# Patient Record
Sex: FEMALE | Race: WHITE | NOT HISPANIC OR LATINO | Employment: STUDENT | URBAN - METROPOLITAN AREA
[De-identification: names, ages, dates, MRNs, and addresses within clinical notes are randomized per-mention and may not be internally consistent; named-entity substitution may affect disease eponyms.]

---

## 2017-03-02 ENCOUNTER — GENERIC CONVERSION - ENCOUNTER (OUTPATIENT)
Dept: OTHER | Facility: OTHER | Age: 22
End: 2017-03-02

## 2017-03-31 ENCOUNTER — ALLSCRIPTS OFFICE VISIT (OUTPATIENT)
Dept: OTHER | Facility: OTHER | Age: 22
End: 2017-03-31

## 2017-03-31 LAB — S PYO AG THROAT QL: NEGATIVE

## 2017-04-02 ENCOUNTER — GENERIC CONVERSION - ENCOUNTER (OUTPATIENT)
Dept: OTHER | Facility: OTHER | Age: 22
End: 2017-04-02

## 2017-04-02 LAB
CULTURE RESULT (HISTORICAL): ABNORMAL
MISCELLANEOUS LAB TEST RESULT (HISTORICAL): ABNORMAL

## 2017-08-24 ENCOUNTER — ALLSCRIPTS OFFICE VISIT (OUTPATIENT)
Dept: OTHER | Facility: OTHER | Age: 22
End: 2017-08-24

## 2017-08-24 ENCOUNTER — GENERIC CONVERSION - ENCOUNTER (OUTPATIENT)
Dept: OTHER | Facility: OTHER | Age: 22
End: 2017-08-24

## 2017-09-25 ENCOUNTER — HOSPITAL ENCOUNTER (EMERGENCY)
Facility: HOSPITAL | Age: 22
Discharge: HOME | End: 2017-09-25
Admitting: EMERGENCY MEDICINE
Payer: COMMERCIAL

## 2017-09-25 ENCOUNTER — APPOINTMENT (EMERGENCY)
Dept: RADIOLOGY | Facility: HOSPITAL | Age: 22
End: 2017-09-25
Payer: COMMERCIAL

## 2017-09-25 VITALS
HEART RATE: 71 BPM | HEIGHT: 63 IN | OXYGEN SATURATION: 98 % | BODY MASS INDEX: 28.88 KG/M2 | SYSTOLIC BLOOD PRESSURE: 118 MMHG | RESPIRATION RATE: 16 BRPM | DIASTOLIC BLOOD PRESSURE: 69 MMHG | WEIGHT: 163 LBS | TEMPERATURE: 98.6 F
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
## 2017-09-25 DIAGNOSIS — S16.1XXA CERVICAL STRAIN, ACUTE: ICD-10-CM

## 2017-09-25 DIAGNOSIS — T14.8XXA ABRASION: ICD-10-CM

## 2017-09-25 PROCEDURE — 72040 X-RAY EXAM NECK SPINE 2-3 VW: CPT

## 2017-09-25 PROCEDURE — 99284 EMERGENCY DEPT VISIT MOD MDM: CPT

## 2017-09-25 RX ORDER — IBUPROFEN 600 MG/1
600 TABLET ORAL ONCE
Status: COMPLETED | OUTPATIENT
Start: 2017-09-25 | End: 2017-09-25

## 2017-09-25 RX ORDER — METHOCARBAMOL 500 MG/1
500 TABLET, FILM COATED ORAL ONCE
Status: COMPLETED | OUTPATIENT
Start: 2017-09-25 | End: 2017-09-25

## 2017-09-25 RX ORDER — SUMATRIPTAN 25 MG/1
5 TABLET, FILM COATED ORAL ONCE AS NEEDED
COMMUNITY
End: 2018-10-25 | Stop reason: ALTCHOICE

## 2017-09-25 RX ORDER — METHOCARBAMOL 500 MG/1
500 TABLET, FILM COATED ORAL 3 TIMES DAILY
Qty: 15 TABLET | Refills: 0 | Status: SHIPPED | OUTPATIENT
Start: 2017-09-25 | End: 2018-10-25

## 2017-09-25 RX ADMIN — IBUPROFEN 600 MG: 600 TABLET, FILM COATED ORAL at 15:44

## 2017-09-25 RX ADMIN — METHOCARBAMOL 500 MG: 500 TABLET ORAL at 15:44

## 2017-09-27 ENCOUNTER — ALLSCRIPTS OFFICE VISIT (OUTPATIENT)
Dept: OTHER | Facility: OTHER | Age: 22
End: 2017-09-27

## 2017-12-14 ENCOUNTER — GENERIC CONVERSION - ENCOUNTER (OUTPATIENT)
Dept: FAMILY MEDICINE CLINIC | Facility: HOSPITAL | Age: 22
End: 2017-12-14

## 2018-01-12 VITALS
HEIGHT: 63 IN | SYSTOLIC BLOOD PRESSURE: 120 MMHG | BODY MASS INDEX: 29.95 KG/M2 | WEIGHT: 169 LBS | DIASTOLIC BLOOD PRESSURE: 68 MMHG

## 2018-01-14 VITALS
BODY MASS INDEX: 29.95 KG/M2 | HEIGHT: 63 IN | SYSTOLIC BLOOD PRESSURE: 120 MMHG | WEIGHT: 169 LBS | DIASTOLIC BLOOD PRESSURE: 68 MMHG

## 2018-01-14 VITALS
DIASTOLIC BLOOD PRESSURE: 82 MMHG | BODY MASS INDEX: 29.8 KG/M2 | HEART RATE: 84 BPM | SYSTOLIC BLOOD PRESSURE: 110 MMHG | TEMPERATURE: 98.7 F | WEIGHT: 168.2 LBS | HEIGHT: 63 IN

## 2018-01-14 VITALS
WEIGHT: 167.4 LBS | SYSTOLIC BLOOD PRESSURE: 118 MMHG | DIASTOLIC BLOOD PRESSURE: 60 MMHG | HEIGHT: 63 IN | TEMPERATURE: 99 F | HEART RATE: 88 BPM | BODY MASS INDEX: 29.66 KG/M2

## 2018-02-05 ENCOUNTER — OFFICE VISIT (OUTPATIENT)
Dept: FAMILY MEDICINE CLINIC | Facility: HOSPITAL | Age: 23
End: 2018-02-05
Payer: COMMERCIAL

## 2018-02-05 VITALS
TEMPERATURE: 98.1 F | RESPIRATION RATE: 12 BRPM | BODY MASS INDEX: 30.51 KG/M2 | SYSTOLIC BLOOD PRESSURE: 128 MMHG | HEART RATE: 80 BPM | HEIGHT: 63 IN | WEIGHT: 172.2 LBS | DIASTOLIC BLOOD PRESSURE: 88 MMHG

## 2018-02-05 DIAGNOSIS — J03.00 STREP TONSILLITIS: Primary | ICD-10-CM

## 2018-02-05 LAB — S PYO AG THROAT QL: POSITIVE

## 2018-02-05 PROCEDURE — 87880 STREP A ASSAY W/OPTIC: CPT | Performed by: FAMILY MEDICINE

## 2018-02-05 PROCEDURE — 99213 OFFICE O/P EST LOW 20 MIN: CPT | Performed by: FAMILY MEDICINE

## 2018-02-05 RX ORDER — AZITHROMYCIN 250 MG/1
TABLET, FILM COATED ORAL
Qty: 6 TABLET | Refills: 0 | Status: SHIPPED | OUTPATIENT
Start: 2018-02-05 | End: 2018-02-10

## 2018-02-05 RX ORDER — SUMATRIPTAN 5 MG/1
SPRAY NASAL
COMMUNITY
End: 2019-01-02

## 2018-02-05 RX ORDER — SUMATRIPTAN 100 MG/1
TABLET, FILM COATED ORAL
Refills: 1 | COMMUNITY
Start: 2017-12-14 | End: 2022-02-01

## 2018-02-05 RX ORDER — ONDANSETRON 4 MG/1
TABLET, ORALLY DISINTEGRATING ORAL
Refills: 0 | COMMUNITY
Start: 2017-12-14

## 2018-02-05 NOTE — PROGRESS NOTES
Assessment/Plan:       Diagnoses and all orders for this visit:    Strep tonsillitis  -     POCT rapid strep A    Other orders  -     SUMAtriptan (IMITREX) 5 MG/ACT nasal spray; into each nostril  -     Etonogestrel (NEXPLANON) 68 MG IMPL; Inject under the skin  -     ondansetron (ZOFRAN-ODT) 4 mg disintegrating tablet; DISSOLVE ONE TABLET BY MOUTH EVERY 6 TO 8 HOURS AS NEEDED FOR NAUSEA  -     SUMAtriptan (IMITREX) 100 mg tablet; TAKE ONE TABLET BY MOUTH AT ONSET OF HEADACHE, MAY REPEAT ONCE IN 2 HOURS IF NEEDED  MAX 3 HEADACHES PER WEEK          Subjective:      Patient ID: Atul Alvarenga is a 23 y o  female  Congestion and sore throat worse over past three days  Fevers low grade on and off  Taking Tylenol with benefit  The following portions of the patient's history were reviewed and updated as appropriate: allergies, current medications, past family history, past medical history, past social history, past surgical history and problem list     Review of Systems   Constitutional: Positive for appetite change, fatigue and fever  HENT: Positive for sinus pressure, sore throat and trouble swallowing  Eyes: Negative for pain and redness  Respiratory: Positive for cough  Negative for shortness of breath and wheezing  Gastrointestinal: Negative for abdominal pain  Neurological: Negative for light-headedness and headaches  Objective:     Physical Exam   Constitutional: She appears well-nourished  No distress  HENT:   Head: Atraumatic  Right Ear: External ear normal    Left Ear: External ear normal    Nose: Nose normal    Mouth/Throat: Oropharyngeal exudate present  Eyes: Conjunctivae and EOM are normal    Neck: No thyromegaly present  Cardiovascular: Normal rate, regular rhythm and normal heart sounds  Pulmonary/Chest: She has wheezes  She has no rales  Lymphadenopathy:     She has cervical adenopathy  Skin: No rash noted

## 2018-02-17 ENCOUNTER — OFFICE VISIT (OUTPATIENT)
Dept: FAMILY MEDICINE CLINIC | Facility: HOSPITAL | Age: 23
End: 2018-02-17
Payer: COMMERCIAL

## 2018-02-17 VITALS
TEMPERATURE: 97.9 F | SYSTOLIC BLOOD PRESSURE: 112 MMHG | HEART RATE: 60 BPM | WEIGHT: 176.2 LBS | BODY MASS INDEX: 31.22 KG/M2 | DIASTOLIC BLOOD PRESSURE: 78 MMHG | HEIGHT: 63 IN

## 2018-02-17 DIAGNOSIS — J02.9 PHARYNGITIS, UNSPECIFIED ETIOLOGY: Primary | ICD-10-CM

## 2018-02-17 LAB — S PYO AG THROAT QL: NEGATIVE

## 2018-02-17 PROCEDURE — 99213 OFFICE O/P EST LOW 20 MIN: CPT | Performed by: NURSE PRACTITIONER

## 2018-02-17 PROCEDURE — 87880 STREP A ASSAY W/OPTIC: CPT | Performed by: NURSE PRACTITIONER

## 2018-02-17 PROCEDURE — 3008F BODY MASS INDEX DOCD: CPT | Performed by: NURSE PRACTITIONER

## 2018-02-17 NOTE — PROGRESS NOTES
Assessment/Plan:    Sore throat with negative rapid strep s/p treatment for strep pharyngitis  Will send throat culture to confirm  Possible viral but if continues should consider mono testing  Instructed to call with temp > 102  Diagnoses and all orders for this visit:    Pharyngitis, unspecified etiology  -     POCT rapid strepA  -     Throat culture          Subjective:      Patient ID: Ramon Colunga is a 23 y o  female  Had strep throat about 1 week ago  Completed antibiotic and felt better  Last night started with bad sore throat again  Overnight unable to swallow  No fever,chills  Denies HA , stomach ache or N/V  Denies nasal congestion and runny nose  Has mild cough  Some ear pain  Denies post nasal drip  The following portions of the patient's history were reviewed and updated as appropriate: allergies, current medications, past medical history, past social history and problem list     Review of Systems   Constitutional: Negative for chills, fatigue and fever  HENT: Positive for congestion, ear pain and trouble swallowing  Respiratory: Positive for cough  Negative for shortness of breath and wheezing  Gastrointestinal: Negative for abdominal pain, nausea and vomiting  Neurological: Negative for headaches  Objective:      /78 (Patient Position: Sitting, Cuff Size: Large)   Pulse 60   Temp 97 9 °F (36 6 °C) (Tympanic)   Ht 5' 3" (1 6 m)   Wt 79 9 kg (176 lb 3 2 oz)   BMI 31 21 kg/m²          Physical Exam   Constitutional: She is oriented to person, place, and time  She appears well-developed and well-nourished  HENT:   Right Ear: Tympanic membrane, external ear and ear canal normal    Left Ear: Tympanic membrane, external ear and ear canal normal    Mouth/Throat: Uvula is midline, oropharynx is clear and moist and mucous membranes are normal        Cardiovascular: Normal rate, regular rhythm and normal heart sounds      Pulmonary/Chest: Effort normal and breath sounds normal    Lymphadenopathy:     She has no cervical adenopathy  Neurological: She is alert and oriented to person, place, and time  Skin: Skin is warm and dry  Psychiatric: She has a normal mood and affect

## 2018-10-25 ENCOUNTER — ANNUAL EXAM (OUTPATIENT)
Dept: OBGYN CLINIC | Facility: CLINIC | Age: 23
End: 2018-10-25
Payer: COMMERCIAL

## 2018-10-25 VITALS
HEART RATE: 86 BPM | DIASTOLIC BLOOD PRESSURE: 76 MMHG | HEIGHT: 63 IN | SYSTOLIC BLOOD PRESSURE: 124 MMHG | WEIGHT: 184 LBS | BODY MASS INDEX: 32.6 KG/M2

## 2018-10-25 DIAGNOSIS — Z97.5 NEXPLANON IN PLACE: ICD-10-CM

## 2018-10-25 DIAGNOSIS — Z11.3 SCREENING FOR STD (SEXUALLY TRANSMITTED DISEASE): ICD-10-CM

## 2018-10-25 DIAGNOSIS — Z01.419 WELL FEMALE EXAM WITH ROUTINE GYNECOLOGICAL EXAM: Primary | ICD-10-CM

## 2018-10-25 PROCEDURE — 99395 PREV VISIT EST AGE 18-39: CPT | Performed by: NURSE PRACTITIONER

## 2018-10-25 NOTE — PROGRESS NOTES
Assessment / Plan    1  Well female exam with routine gynecological exam  Normal well woman exam  Patient desires STD screening    - Chlamydia/N  gonorrhoeae RNA, TMA    2  Screening for STD (sexually transmitted disease)  As above  Also interested in LN IUD  - Chlamydia/N  gonorrhoeae RNA, TMA    3  Nexplanon in place  Inserted 10/2015  Due for removal   Patient desires LN-IUD, for which she is an appropriate candidate  Pamphlet for Mirena given  preauth request made-- when complete, RV for IUD insertion and Nexplanon removal       Charles Muro is a 23 y o  female who presents for her annual gynecologic exam     Irregular menstrual bleeding  Has nexplanon which was inserted 10/2015  Interested in [id number] IUD  C/o vaginal dryness/ discomfort with sex past few months  Last pap: n/a  STD screening: desires  Gardasil vaccine: yes    Present sexual partner 5 year relationship  Using condoms  Nexplanon- 10/2015    Periods are irregular, Q 2 weeks  Current contraception: Nexplanon  History of abnormal Pap smear: n/a  Family history of breast,uterine, ovarian or colon cancer: no    Menstrual History:  OB History      Para Term  AB Living    0 0 0 0 0 0    SAB TAB Ectopic Multiple Live Births    0 0 0 0 0        Obstetric Comments    Age at menarche 8  Hx of pregnancy denied  Nexplanon insertion          Menarche age: 8  Patient's last menstrual period was 10/13/2018 (exact date)    Period Cycle (Days): 15  Period Duration (Days): 5 to 7 days   Period Pattern: (!) Irregular  Menstrual Flow: Moderate, Light  Menstrual Control: Maxi pad, Tampon  Dysmenorrhea: (!) Moderate  Dysmenorrhea Symptoms: Cramping, Nausea, Diarrhea, Headache    The following portions of the patient's history were reviewed and updated as appropriate: allergies, current medications, past family history, past medical history, past social history, past surgical history and problem list     Review of Systems Review of Systems   Constitutional: Negative for chills and fever  Gastrointestinal: Negative for abdominal distention, abdominal pain, blood in stool, constipation, diarrhea, nausea and vomiting  Genitourinary: Positive for dyspareunia and menstrual problem  Negative for difficulty urinating, dysuria, frequency, genital sores, hematuria, pelvic pain, urgency, vaginal bleeding and vaginal discharge  Breasts:  Negative for skin changes, dimpling, asymmetry, nipple discharge, redness, tenderness or palpable masses    Objective      /76   Pulse 86   Ht 5' 3" (1 6 m)   Wt 83 5 kg (184 lb)   LMP 10/13/2018 (Exact Date)   Breastfeeding? No   BMI 32 59 kg/m²      Physical Exam   Constitutional: She is oriented to person, place, and time  She appears well-developed and well-nourished  No distress  Neck: Neck supple  No thyromegaly present  Pulmonary/Chest: Right breast exhibits no inverted nipple, no mass, no nipple discharge, no skin change and no tenderness  Left breast exhibits no inverted nipple, no mass, no nipple discharge, no skin change and no tenderness  Breasts are symmetrical    Abdominal: Soft  Normal appearance  She exhibits no mass  There is no tenderness  There is no CVA tenderness  Genitourinary: No labial fusion  There is no rash, tenderness, lesion or injury on the right labia  There is no rash, tenderness, lesion or injury on the left labia  Uterus is not enlarged and not tender  Cervix exhibits no motion tenderness, no discharge and no friability  Right adnexum displays no mass and no tenderness  Left adnexum displays no mass and no tenderness  No erythema, tenderness or bleeding in the vagina  No foreign body in the vagina  No signs of injury around the vagina  No vaginal discharge found  Lymphadenopathy:     She has no cervical adenopathy  She has no axillary adenopathy  Right: No inguinal and no supraclavicular adenopathy present          Left: No inguinal and no supraclavicular adenopathy present  Neurological: She is alert and oriented to person, place, and time  Skin: Skin is warm, dry and intact  Psychiatric: She has a normal mood and affect   Her behavior is normal

## 2018-10-26 LAB
C TRACH RRNA SPEC QL NAA+PROBE: NOT DETECTED
N GONORRHOEA RRNA SPEC QL NAA+PROBE: NOT DETECTED

## 2018-11-09 ENCOUNTER — TELEPHONE (OUTPATIENT)
Dept: OBGYN CLINIC | Facility: CLINIC | Age: 23
End: 2018-11-09

## 2018-11-09 NOTE — TELEPHONE ENCOUNTER
LMOM for patient to call office  Patient interested in the removal of Nexplanon and insertion of Mirena  Patient is covered at 100% with no co-pay and may schedule appointment

## 2019-01-02 ENCOUNTER — PROCEDURE VISIT (OUTPATIENT)
Dept: OBGYN CLINIC | Facility: CLINIC | Age: 24
End: 2019-01-02
Payer: COMMERCIAL

## 2019-01-02 VITALS
SYSTOLIC BLOOD PRESSURE: 124 MMHG | WEIGHT: 188.4 LBS | HEART RATE: 71 BPM | DIASTOLIC BLOOD PRESSURE: 80 MMHG | BODY MASS INDEX: 33.37 KG/M2 | OXYGEN SATURATION: 99 %

## 2019-01-02 DIAGNOSIS — Z30.46 ENCOUNTER FOR NEXPLANON REMOVAL: Primary | ICD-10-CM

## 2019-01-02 DIAGNOSIS — Z30.430 ENCOUNTER FOR IUD INSERTION: ICD-10-CM

## 2019-01-02 LAB — SL AMB POCT URINE HCG: NEGATIVE

## 2019-01-02 PROCEDURE — 11982 REMOVE DRUG IMPLANT DEVICE: CPT | Performed by: OBSTETRICS & GYNECOLOGY

## 2019-01-02 PROCEDURE — 81025 URINE PREGNANCY TEST: CPT | Performed by: OBSTETRICS & GYNECOLOGY

## 2019-01-02 PROCEDURE — 58300 INSERT INTRAUTERINE DEVICE: CPT | Performed by: OBSTETRICS & GYNECOLOGY

## 2019-01-02 NOTE — PROGRESS NOTES
Remove and insert drug implant  Date/Time: 1/2/2019 3:00 PM  Performed by: Micky Frye by: Constanza Pereira     Consent:     Consent obtained:  Verbal and written    Consent given by:  Patient    Procedural risks discussed:  Bleeding, failure rate, infection, possible continued pain and repeat procedure    Patient questions answered: yes      Patient agrees, verbalizes understanding, and wants to proceed: yes      Educational handouts given: yes      Instructions and paperwork completed: yes    Indication:     Indication: Presence of non-biodegradable drug delivery implant    Pre-procedure:     Pre-procedure timeout performed: yes      Prepped with: povidone-iodine      Local anesthetic:  Lidocaine 1%    The site was cleaned and prepped in a sterile fashion: yes    Procedure:     Procedure:  Removal    Small stab incision was made in arm: yes      Left/right:  Left    Visualization of implant was obtained: yes      Site was closed with steri-strips and pressure bandage applied: yes    Comments:      Nexplanon removed secondary to expiration 10/2018 without difficulty  Iud insertions  Date/Time: 1/2/2019 3:00 PM  Performed by: Micky Frye by: Constanza Pereira     Consent:     Consent obtained:  Written and verbal    Consent given by:  Patient    Procedure risks and benefits discussed: yes      Patient questions answered: yes      Patient agrees, verbalizes understanding, and wants to proceed: yes      Educational handouts given: yes      Instructions and paperwork completed: yes    Procedure:     Pelvic exam performed: yes      Negative GC/chlamydia test: yes      Negative urine pregnancy test: yes      Cervix cleaned and prepped: yes      Speculum placed in vagina: yes      Tenaculum applied to cervix: yes [place])      Uterus sounded: yes      IUD inserted with no complications: yes      IUD type: [place]      Strings trimmed: yes (3 cm)      Uterus sound depth (cm):  7 5  Post-procedure: Patient tolerated procedure well: yes      Patient will follow up after next period: yes

## 2019-02-25 ENCOUNTER — OFFICE VISIT (OUTPATIENT)
Dept: OBGYN CLINIC | Facility: CLINIC | Age: 24
End: 2019-02-25
Payer: COMMERCIAL

## 2019-02-25 VITALS
WEIGHT: 187 LBS | HEART RATE: 99 BPM | BODY MASS INDEX: 34.41 KG/M2 | HEIGHT: 62 IN | SYSTOLIC BLOOD PRESSURE: 120 MMHG | DIASTOLIC BLOOD PRESSURE: 80 MMHG

## 2019-02-25 DIAGNOSIS — Z30.431 CONTRACEPTIVE, SURVEILLANCE, INTRAUTERINE DEVICE: Primary | ICD-10-CM

## 2019-02-25 PROCEDURE — 99213 OFFICE O/P EST LOW 20 MIN: CPT | Performed by: OBSTETRICS & GYNECOLOGY

## 2019-02-25 NOTE — PROGRESS NOTES
Patient is a 26 y o  Holly Brine with Patient's last menstrual period was 2019 (exact date)  who presents in follow up to removal of Nexplanon and insertion of Ivette Mon  Pt reports she is doing well  She has had intercourse without complication  She denies any abnormal bleeding  She reports she has had a normal menses since insertion on 2019 and she is due for another menses in a few days  She denies si/sx of infection  Past Medical History:   Diagnosis Date    Metrorrhagia     Migraine     Nexplanon in place     Vaccine for human papilloma virus (HPV) types 6, 11, 16, and 18 administered        Past Surgical History:   Procedure Laterality Date    TOOTH EXTRACTION         OB History    Para Term  AB Living   0 0 0 0 0 0   SAB TAB Ectopic Multiple Live Births   0 0 0 0 0   Obstetric Comments   Age at menarche 8   Hx of pregnancy denied   Nexplanon insertion              Current Outpatient Medications:     levonorgestrel (KYLEENA) 19 5 MG intrauterine device, 1 Intra Uterine Device by Intrauterine route once Inserted 19, Disp: , Rfl:     ondansetron (ZOFRAN-ODT) 4 mg disintegrating tablet, DISSOLVE ONE TABLET BY MOUTH EVERY 6 TO 8 HOURS AS NEEDED FOR NAUSEA, Disp: , Rfl: 0    SUMAtriptan (IMITREX) 100 mg tablet, TAKE ONE TABLET BY MOUTH AT ONSET OF HEADACHE, MAY REPEAT ONCE IN 2 HOURS IF NEEDED   MAX 3 HEADACHES PER WEEK, Disp: , Rfl: 1    topiramate (TOPAMAX) 200 MG tablet, Take 200 mg by mouth daily, Disp: , Rfl:     Allergies   Allergen Reactions    Penicillins Hives       Social History     Socioeconomic History    Marital status: Single     Spouse name: None    Number of children: None    Years of education: high school graduate    Highest education level: None   Occupational History    None   Social Needs    Financial resource strain: None    Food insecurity:     Worry: None     Inability: None    Transportation needs:     Medical: None     Non-medical: None Tobacco Use    Smoking status: Never Smoker    Smokeless tobacco: Never Used   Substance and Sexual Activity    Alcohol use: No    Drug use: No    Sexual activity: Yes     Partners: Male     Birth control/protection: IUD   Lifestyle    Physical activity:     Days per week: None     Minutes per session: None    Stress: None   Relationships    Social connections:     Talks on phone: None     Gets together: None     Attends Amish service: None     Active member of club or organization: None     Attends meetings of clubs or organizations: None     Relationship status: None    Intimate partner violence:     Fear of current or ex partner: None     Emotionally abused: None     Physically abused: None     Forced sexual activity: None   Other Topics Concern    None   Social History Narrative    Always uses seat belt    Exercise habits, active but no formal exercise     Lack of exercise     No preference on Amish beliefs       Family History   Problem Relation Age of Onset    Hypertension Mother     Hypertension Father     Heart disease Father         cardiac disorder,  7 stents     Thyroid cancer Father     Diabetes Maternal Grandmother     Cancer Maternal Grandmother     Throat cancer Maternal Grandmother     Brain cancer Family        Review of Systems   Constitutional: Negative for chills, fatigue, fever and unexpected weight change  HENT: Negative for congestion, mouth sores and sore throat  Respiratory: Negative for cough, chest tightness, shortness of breath and wheezing  Cardiovascular: Negative for chest pain and palpitations  Gastrointestinal: Negative for abdominal distention, abdominal pain, constipation, diarrhea, nausea and vomiting  Endocrine: Negative for cold intolerance and heat intolerance  Genitourinary: Negative for dyspareunia, dysuria, genital sores, menstrual problem, pelvic pain, vaginal bleeding, vaginal discharge and vaginal pain     Musculoskeletal: Negative for arthralgias  Skin: Negative for color change and rash  Neurological: Negative for dizziness, light-headedness and headaches  Hematological: Negative for adenopathy  Blood pressure 120/80, pulse 99, height 5' 2" (1 575 m), weight 84 8 kg (187 lb), last menstrual period 01/28/2019, not currently breastfeeding  and Body mass index is 34 2 kg/m²  Physical Exam   Constitutional: She is oriented to person, place, and time  She appears well-developed and well-nourished  HENT:   Head: Normocephalic and atraumatic  Eyes: Conjunctivae and EOM are normal    Neck: Normal range of motion  Pulmonary/Chest: Effort normal    Abdominal: Soft  She exhibits no distension and no mass  There is no tenderness  There is no rebound and no guarding  Musculoskeletal: Normal range of motion  She exhibits no edema or tenderness  Neurological: She is alert and oriented to person, place, and time  Skin: Skin is warm  No rash noted  No erythema  Psychiatric: She has a normal mood and affect  Her behavior is normal  Judgment and thought content normal         vulva: normal external genitalia for age and no lesions, masses, epithelial changes, or exudate  vagina: color pink and rugae  well formed rugae  cervix: nullip, no lesions  and  IUD string  3 cm  uterus: NSSC, AF, NT, mobile  adnexa: no masses or tenderness      A/P:  Pt is a 26 y o  Monica Snide with      Diagnoses and all orders for this visit:    Contraceptive, surveillance, intrauterine device      Doing well  IUD appropriately located  F/U for annual exam or prn

## 2019-07-02 ENCOUNTER — TELEPHONE (OUTPATIENT)
Dept: FAMILY MEDICINE CLINIC | Facility: HOSPITAL | Age: 24
End: 2019-07-02

## 2019-09-23 ENCOUNTER — TELEPHONE (OUTPATIENT)
Dept: FAMILY MEDICINE CLINIC | Facility: HOSPITAL | Age: 24
End: 2019-09-23

## 2019-11-06 ENCOUNTER — OFFICE VISIT (OUTPATIENT)
Dept: FAMILY MEDICINE CLINIC | Facility: HOSPITAL | Age: 24
End: 2019-11-06
Payer: COMMERCIAL

## 2019-11-06 VITALS
SYSTOLIC BLOOD PRESSURE: 130 MMHG | WEIGHT: 184 LBS | DIASTOLIC BLOOD PRESSURE: 82 MMHG | HEIGHT: 62 IN | BODY MASS INDEX: 33.86 KG/M2 | HEART RATE: 68 BPM | TEMPERATURE: 96.7 F

## 2019-11-06 DIAGNOSIS — Z13.6 SCREENING FOR CARDIOVASCULAR CONDITION: ICD-10-CM

## 2019-11-06 DIAGNOSIS — F43.21 GRIEF REACTION: ICD-10-CM

## 2019-11-06 DIAGNOSIS — Z13.1 SCREENING FOR DIABETES MELLITUS (DM): ICD-10-CM

## 2019-11-06 DIAGNOSIS — Z00.00 ANNUAL PHYSICAL EXAM: Primary | ICD-10-CM

## 2019-11-06 PROCEDURE — 99395 PREV VISIT EST AGE 18-39: CPT | Performed by: FAMILY MEDICINE

## 2019-11-06 RX ORDER — LORAZEPAM 0.5 MG/1
0.5 TABLET ORAL EVERY 8 HOURS PRN
Qty: 30 TABLET | Refills: 0 | Status: SHIPPED | OUTPATIENT
Start: 2019-11-06 | End: 2020-07-22

## 2019-11-06 NOTE — ASSESSMENT & PLAN NOTE
Discussed grief reaction  Agree with p r n  Use of lorazepam for severe grief and anxiety  No thoughts of hurting herself  No thoughts of worthlessness  No homicidal ideation  Discussed se/ar of ativan to include dependence and addiction  Agree with grief counseling  Follow-up in 3 months

## 2019-11-06 NOTE — PATIENT INSTRUCTIONS

## 2019-11-06 NOTE — PROGRESS NOTES
Caden Montalvo MD    NAME: Vanesa Choi  AGE: 26 y o  SEX: female  : 1995     DATE: 2019     Assessment and Plan:     Problem List Items Addressed This Visit        Other    Grief reaction     Discussed grief reaction  Agree with p r n  Use of lorazepam for severe grief and anxiety  No thoughts of hurting herself  No thoughts of worthlessness  No homicidal ideation  Agree with grief counseling  Follow-up in 3 months  Relevant Medications    LORazepam (ATIVAN) 0 5 mg tablet    Other Relevant Orders    TSH, 3rd generation with Free T4 reflex      Other Visit Diagnoses     Annual physical exam    -  Primary    Screening for cardiovascular condition        Relevant Orders    CBC (Includes Diff/Plt) (Refl)    Comprehensive metabolic panel    Lipid Panel with Direct LDL reflex    TSH, 3rd generation with Free T4 reflex    Screening for diabetes mellitus (DM)        Relevant Orders    Hemoglobin A1C With EAG          Immunizations and preventive care screenings were discussed with patient today  Appropriate education was printed on patient's after visit summary  Counseling:  Alcohol/drug use: discussed moderation in alcohol intake, the recommendations for healthy alcohol use, and avoidance of illicit drug use  Sexual health: discussed sexually transmitted diseases, partner selection, use of condoms, avoidance of unintended pregnancy, and contraceptive alternatives  · Exercise: the importance of regular exercise/physical activity was discussed  Recommend exercise 3-5 times per week for at least 30 minutes  Return in about 3 months (around 2020)  Chief Complaint:     Chief Complaint   Patient presents with    Annual Exam      History of Present Illness:     Adult Annual Physical   Patient here for a comprehensive physical exam  The patient reports with ongoing grief    Has moments of severe sadness and anxiety  Has good family support  Brother  a few months ago  Will be going to grief counselling  Asking for prn lorazepam        Diet and Physical Activity  · Diet/Nutrition: well balanced diet  · Exercise: no formal exercise  Depression Screening  PHQ-9 Depression Screening    PHQ-9:    Frequency of the following problems over the past two weeks:       Little interest or pleasure in doing things:  0 - not at all  Feeling down, depressed, or hopeless:  0 - not at all  PHQ-2 Score:  0       General Health  · Sleep: sleeps well  · Hearing: normal - bilateral   · Vision: no vision problems  · Dental: regular dental visits  /GYN Health  · Last menstrual period:   · Contraceptive method: nexplanon  · History of STDs?: no      Review of Systems:     Review of Systems   Constitutional: Negative  Negative for activity change, appetite change, chills, diaphoresis, fatigue and fever  HENT: Negative for congestion, facial swelling and sore throat  Respiratory: Negative  Negative for apnea, cough, chest tightness and shortness of breath  Cardiovascular: Negative  Negative for chest pain and palpitations  Gastrointestinal: Negative  Negative for abdominal distention, abdominal pain, blood in stool, constipation, diarrhea and nausea  Genitourinary: Negative  Negative for difficulty urinating, dysuria, flank pain and frequency        Past Medical History:     Past Medical History:   Diagnosis Date    Metrorrhagia     Migraine     Nexplanon in place     Vaccine for human papilloma virus (HPV) types 6, 11, 16, and 18 administered       Past Surgical History:     Past Surgical History:   Procedure Laterality Date    TOOTH EXTRACTION        Social History:     Social History     Socioeconomic History    Marital status: Single     Spouse name: None    Number of children: None    Years of education: high school graduate    Highest education level: None   Occupational History    None Social Needs    Financial resource strain: None    Food insecurity:     Worry: None     Inability: None    Transportation needs:     Medical: None     Non-medical: None   Tobacco Use    Smoking status: Never Smoker    Smokeless tobacco: Never Used   Substance and Sexual Activity    Alcohol use: No    Drug use: No    Sexual activity: Yes     Partners: Male     Birth control/protection: IUD   Lifestyle    Physical activity:     Days per week: None     Minutes per session: None    Stress: None   Relationships    Social connections:     Talks on phone: None     Gets together: None     Attends Zoroastrian service: None     Active member of club or organization: None     Attends meetings of clubs or organizations: None     Relationship status: None    Intimate partner violence:     Fear of current or ex partner: None     Emotionally abused: None     Physically abused: None     Forced sexual activity: None   Other Topics Concern    None   Social History Narrative    Always uses seat belt    Exercise habits, active but no formal exercise     Lack of exercise     No preference on Zoroastrian beliefs      Family History:     Family History   Problem Relation Age of Onset    Hypertension Mother     Hypertension Father     Heart disease Father         cardiac disorder,  7 stents     Thyroid cancer Father     Diabetes Maternal Grandmother     Cancer Maternal Grandmother     Throat cancer Maternal Grandmother     Brain cancer Family       Current Medications:     Current Outpatient Medications   Medication Sig Dispense Refill    levonorgestrel (KYLEENA) 19 5 MG intrauterine device 1 Intra Uterine Device by Intrauterine route once Inserted 1/2/19      ondansetron (ZOFRAN-ODT) 4 mg disintegrating tablet DISSOLVE ONE TABLET BY MOUTH EVERY 6 TO 8 HOURS AS NEEDED FOR NAUSEA  0    SUMAtriptan (IMITREX) 100 mg tablet TAKE ONE TABLET BY MOUTH AT ONSET OF HEADACHE, MAY REPEAT ONCE IN 2 HOURS IF NEEDED   MAX 3 HEADACHES PER WEEK  1    topiramate (TOPAMAX) 200 MG tablet Take 200 mg by mouth daily      LORazepam (ATIVAN) 0 5 mg tablet Take 1 tablet (0 5 mg total) by mouth every 8 (eight) hours as needed for anxiety 30 tablet 0     No current facility-administered medications for this visit  Allergies: Allergies   Allergen Reactions    Penicillins Hives      Physical Exam:     /82   Pulse 68   Temp (!) 96 7 °F (35 9 °C)   Ht 5' 2" (1 575 m)   Wt 83 5 kg (184 lb)   BMI 33 65 kg/m²     Physical Exam   Constitutional: She is oriented to person, place, and time  She appears well-developed and well-nourished  HENT:   Head: Normocephalic and atraumatic  Right Ear: External ear normal    Left Ear: External ear normal    Nose: Nose normal    Mouth/Throat: Oropharynx is clear and moist    Eyes: Pupils are equal, round, and reactive to light  Conjunctivae and EOM are normal    Neck: Normal range of motion  Neck supple  No tracheal deviation present  No thyromegaly present  Cardiovascular: Normal rate, regular rhythm and normal heart sounds  No murmur heard  Pulmonary/Chest: Effort normal and breath sounds normal  No respiratory distress  She has no wheezes  Abdominal: Soft  Bowel sounds are normal    Musculoskeletal: Normal range of motion  Neurological: She is oriented to person, place, and time  Skin: Skin is warm and dry  Psychiatric: Her speech is normal and behavior is normal  Judgment normal  Her mood appears anxious  Her affect is not angry  She is not actively hallucinating  Thought content is not paranoid  Cognition and memory are normal  She exhibits a depressed mood  She expresses no homicidal and no suicidal ideation  She is attentive         MD Aura Wade MD

## 2020-01-29 NOTE — TELEPHONE ENCOUNTER
01/29/20 2:03 PM     Thank you for your request  Your request has been received, reviewed, and noted that it no longer requires attention  This message will now be completed      Thank you  Drew Ask

## 2020-07-22 ENCOUNTER — OFFICE VISIT (OUTPATIENT)
Dept: FAMILY MEDICINE CLINIC | Facility: HOSPITAL | Age: 25
End: 2020-07-22
Payer: COMMERCIAL

## 2020-07-22 VITALS
DIASTOLIC BLOOD PRESSURE: 92 MMHG | HEIGHT: 62 IN | SYSTOLIC BLOOD PRESSURE: 138 MMHG | WEIGHT: 174.8 LBS | HEART RATE: 78 BPM | BODY MASS INDEX: 32.17 KG/M2 | TEMPERATURE: 98.6 F

## 2020-07-22 DIAGNOSIS — R35.0 URINARY FREQUENCY: ICD-10-CM

## 2020-07-22 DIAGNOSIS — N30.01 ACUTE CYSTITIS WITH HEMATURIA: Primary | ICD-10-CM

## 2020-07-22 LAB
SL AMB  POCT GLUCOSE, UA: NORMAL
SL AMB LEUKOCYTE ESTERASE,UA: ABNORMAL
SL AMB POCT BILIRUBIN,UA: NEGATIVE
SL AMB POCT BLOOD,UA: 250
SL AMB POCT CLARITY,UA: CLEAR
SL AMB POCT COLOR,UA: YELLOW
SL AMB POCT KETONES,UA: ABNORMAL
SL AMB POCT NITRITE,UA: POSITIVE
SL AMB POCT PH,UA: 5
SL AMB POCT SPECIFIC GRAVITY,UA: 1.02
SL AMB POCT URINE PROTEIN: ABNORMAL
SL AMB POCT UROBILINOGEN: NORMAL

## 2020-07-22 PROCEDURE — 81002 URINALYSIS NONAUTO W/O SCOPE: CPT | Performed by: FAMILY MEDICINE

## 2020-07-22 PROCEDURE — 99213 OFFICE O/P EST LOW 20 MIN: CPT | Performed by: FAMILY MEDICINE

## 2020-07-22 PROCEDURE — 3008F BODY MASS INDEX DOCD: CPT | Performed by: FAMILY MEDICINE

## 2020-07-22 PROCEDURE — 1036F TOBACCO NON-USER: CPT | Performed by: FAMILY MEDICINE

## 2020-07-22 RX ORDER — SULFAMETHOXAZOLE AND TRIMETHOPRIM 800; 160 MG/1; MG/1
1 TABLET ORAL 2 TIMES DAILY
Qty: 6 TABLET | Refills: 0 | Status: SHIPPED | OUTPATIENT
Start: 2020-07-22 | End: 2020-07-25

## 2020-07-23 NOTE — PROGRESS NOTES
Assessment/Plan:      Problem List Items Addressed This Visit     None      Visit Diagnoses     Acute cystitis with hematuria    -  Primary    Relevant Medications    sulfamethoxazole-trimethoprim (BACTRIM DS) 800-160 mg per tablet    Urinary frequency        Relevant Orders    Urine culture    POCT urine dip (Completed)       empirically treat with bactrim  Await culture  To call if not improving  Subjective:   Chief Complaint   Patient presents with    Possible UTI     Complains of urinary frequency and lower abdominal pressure         Patient ID: Mau Thao is a 27 y o  female  Few days of dysuria, frequency,a nd urgency  No fever, no chills  No low back pain  No recurrent UTI  No hx of stones  Not related to sexual intercourse  No hematuria  The following portions of the patient's history were reviewed and updated as appropriate: allergies, current medications, past family history, past medical history, past social history, past surgical history and problem list     Review of Systems   Constitutional: Negative  Negative for activity change, appetite change, chills, diaphoresis, fatigue and fever  HENT: Negative for congestion, facial swelling and sore throat  Respiratory: Negative  Negative for apnea, cough, chest tightness and shortness of breath  Cardiovascular: Negative  Negative for chest pain and palpitations  Gastrointestinal: Negative  Negative for abdominal distention, abdominal pain, blood in stool, constipation, diarrhea and nausea  Genitourinary: Positive for dysuria, frequency and urgency  Negative for difficulty urinating, flank pain and hematuria           Objective:  Vitals:    07/22/20 1729   BP: 138/92   Pulse: 78   Temp: 98 6 °F (37 °C)   Weight: 79 3 kg (174 lb 12 8 oz)   Height: 5' 2" (1 575 m)     BP Readings from Last 6 Encounters:   07/22/20 138/92   11/06/19 130/82   02/25/19 120/80   01/02/19 124/80   10/25/18 124/76 02/17/18 112/78      Wt Readings from Last 6 Encounters:   07/22/20 79 3 kg (174 lb 12 8 oz)   11/06/19 83 5 kg (184 lb)   02/25/19 84 8 kg (187 lb)   01/02/19 85 5 kg (188 lb 6 4 oz)   10/25/18 83 5 kg (184 lb)   02/17/18 79 9 kg (176 lb 3 2 oz)             Physical Exam   Constitutional: She is oriented to person, place, and time  She appears well-developed and well-nourished  HENT:   Head: Normocephalic and atraumatic  Right Ear: External ear normal    Left Ear: External ear normal    Nose: Nose normal    Mouth/Throat: Oropharynx is clear and moist    Eyes: Pupils are equal, round, and reactive to light  Conjunctivae and EOM are normal    Neck: Normal range of motion  Neck supple  No tracheal deviation present  No thyromegaly present  Cardiovascular: Normal rate, regular rhythm and normal heart sounds  No murmur heard  Pulmonary/Chest: Effort normal and breath sounds normal  No respiratory distress  She has no wheezes  Abdominal: Soft  Normal appearance and bowel sounds are normal  There is no rebound, no CVA tenderness, no tenderness at McBurney's point and negative Alcazar's sign  Musculoskeletal: Normal range of motion  Neurological: She is oriented to person, place, and time  Skin: Skin is warm and dry  Nursing note and vitals reviewed          Office Visit on 07/22/2020   Component Date Value Ref Range Status    LEUKOCYTE ESTERASE,UA 07/22/2020 ++   Final    NITRITE,UA 07/22/2020 Positive   Final    SL AMB POCT UROBILINOGEN 07/22/2020 Normal   Final    POCT URINE PROTEIN 07/22/2020 ++500   Final     PH,UA 07/22/2020 5   Final    BLOOD,UA 07/22/2020 250   Final    SPECIFIC GRAVITY,UA 07/22/2020 1 020   Final    KETONES,UA 07/22/2020 +Small   Final    BILIRUBIN,UA 07/22/2020 Negative   Final    GLUCOSE, UA 07/22/2020 Normal   Final     COLOR,UA 07/22/2020 Yellow   Final    CLARITY,UA 07/22/2020 Clear   Final

## 2021-01-07 ENCOUNTER — ANNUAL EXAM (OUTPATIENT)
Dept: OBGYN CLINIC | Facility: CLINIC | Age: 26
End: 2021-01-07
Payer: COMMERCIAL

## 2021-01-07 VITALS
OXYGEN SATURATION: 99 % | DIASTOLIC BLOOD PRESSURE: 74 MMHG | SYSTOLIC BLOOD PRESSURE: 118 MMHG | HEART RATE: 71 BPM | WEIGHT: 172.8 LBS | BODY MASS INDEX: 30.62 KG/M2 | HEIGHT: 63 IN

## 2021-01-07 DIAGNOSIS — Z12.4 PAP SMEAR FOR CERVICAL CANCER SCREENING: ICD-10-CM

## 2021-01-07 DIAGNOSIS — Z72.3 INADEQUATE EXERCISE: ICD-10-CM

## 2021-01-07 DIAGNOSIS — E58 DIETARY CALCIUM DEFICIENCY: ICD-10-CM

## 2021-01-07 DIAGNOSIS — Z30.431 CONTRACEPTIVE, SURVEILLANCE, INTRAUTERINE DEVICE: ICD-10-CM

## 2021-01-07 DIAGNOSIS — Z01.419 ENCOUNTER FOR ANNUAL ROUTINE GYNECOLOGICAL EXAMINATION: Primary | ICD-10-CM

## 2021-01-07 DIAGNOSIS — Z11.3 SCREENING EXAMINATION FOR STD (SEXUALLY TRANSMITTED DISEASE): ICD-10-CM

## 2021-01-07 PROBLEM — J03.00 STREP TONSILLITIS: Status: RESOLVED | Noted: 2018-02-05 | Resolved: 2021-01-07

## 2021-01-07 PROBLEM — F43.21 GRIEF REACTION: Status: RESOLVED | Noted: 2019-11-06 | Resolved: 2021-01-07

## 2021-01-07 PROCEDURE — S0612 ANNUAL GYNECOLOGICAL EXAMINA: HCPCS | Performed by: OBSTETRICS & GYNECOLOGY

## 2021-01-07 NOTE — PROGRESS NOTES
Pt is a 23 y o  Doris Ku with Patient's last menstrual period was 2020 (approximate)  using [employer] for [facility] presents for preventive care  She notes the same partner since her last STI evaluation  In her lifetime she has been involved with 1 partner   Safe sexual practices (monogomy, condoms) are followed consistently  · She does  feel safe in the relationship  She does feel safe in her home  · Her calcium intake encompasses prenatal vitamin, milk (cow, goat, almond, cashew, soy, etc) and cheese for a total of 2-3 servings daily on average  She does take additional Vitamin D (MVI or supplement)  · She exercises 0 times per week  · Her menses occur every 28 Days, last 3-5 days and require maxipad every 6-7 hours  Menstrual History:  OB History        0    Para   0    Term   0       0    AB   0    Living   0       SAB   0    TAB   0    Ectopic   0    Multiple   0    Live Births   0           Obstetric Comments   Age at menarche 8      Menses: 28/3-5/maxipad every 6-7            Menarche age: 8  Patient's last menstrual period was 2020 (approximate)  Period Cycle (Days): 28  Period Duration (Days): 3-5  Period Pattern: Regular  Menstrual Flow: Moderate  Menstrual Control: Maxi pad  ·      · She has completed the HPV vaccine series appropriate for age    · tobacco use : does not use tobacco              · Colonoscopy/mammogram: not indicated  · Pap: never had, obtained today  · Accepts chlamydia and gonorrhea screening    Past Medical History:   Diagnosis Date    Grief reaction 2019    Metrorrhagia     Migraine     Nexplanon in place     Strep tonsillitis 2018    Vaccine for human papilloma virus (HPV) types 6, 11, 16, and 18 administered     completed series       Past Surgical History:   Procedure Laterality Date    WISDOM TOOTH EXTRACTION         OB History    Para Term  AB Living   0 0 0 0 0 0   SAB TAB Ectopic Multiple Live Births   0 0 0 0 0   Obstetric Comments   Age at menarche 8         Menses: 28/3-5/maxipad every 6-7            Current Outpatient Medications:     levonorgestrel (KYLEENA) 19 5 MG intrauterine device, 1 Intra Uterine Device by Intrauterine route once Inserted 1/2/19, Disp: , Rfl:     ondansetron (ZOFRAN-ODT) 4 mg disintegrating tablet, DISSOLVE ONE TABLET BY MOUTH EVERY 6 TO 8 HOURS AS NEEDED FOR NAUSEA, Disp: , Rfl: 0    Prenatal Vit-Fe Fumarate-FA (PRENATAL 1+1 PO), Take by mouth, Disp: , Rfl:     SUMAtriptan (IMITREX) 100 mg tablet, TAKE ONE TABLET BY MOUTH AT ONSET OF HEADACHE, MAY REPEAT ONCE IN 2 HOURS IF NEEDED  MAX 3 HEADACHES PER WEEK, Disp: , Rfl: 1    topiramate (TOPAMAX) 200 MG tablet, Take 200 mg by mouth daily, Disp: , Rfl:     Allergies   Allergen Reactions    Penicillins Hives       Social History     Socioeconomic History    Marital status: Single     Spouse name: None    Number of children: 0    Years of education: college student    Highest education level: High school graduate   Occupational History    Occupation:    Social Needs    Financial resource strain: None    Food insecurity     Worry: None     Inability: None    Transportation needs     Medical: None     Non-medical: None   Tobacco Use    Smoking status: Never Smoker    Smokeless tobacco: Never Used   Substance and Sexual Activity    Alcohol use: Yes     Alcohol/week: 0 0 - 1 0 standard drinks     Frequency: Monthly or less     Drinks per session: 1 or 2     Binge frequency: Never     Comment: special occasions only    Drug use: No    Sexual activity: Yes     Partners: Male     Birth control/protection: I U D       Comment: lifetime partners: 1   Lifestyle    Physical activity     Days per week: None     Minutes per session: None    Stress: None   Relationships    Social connections     Talks on phone: None     Gets together: None     Attends Yazidi service: None     Active member of club or organization: None Attends meetings of clubs or organizations: None     Relationship status: None    Intimate partner violence     Fear of current or ex partner: None     Emotionally abused: None     Physically abused: None     Forced sexual activity: None   Other Topics Concern    None   Social History Narrative    Always uses seat belt    No preference on Voodoo beliefs    Accepts blood products            Exercise: none    Calcium: 1 c milk 3x/week, 1 cheese daily, PNV daily       Family History   Problem Relation Age of Onset    Hypertension Mother     Hypertension Father     Heart disease Father         cardiac disorder,  7 stents ;  52's    Thyroid cancer Father     Diabetes Maternal Grandmother     Throat cancer Maternal Grandmother     Brain cancer Family     No Known Problems Sister         estranged    No Known Problems Brother     Kidney cancer Maternal Grandfather     No Known Problems Paternal Grandmother     No Known Problems Paternal Larene Larch Suicidality Brother         2019    Drug abuse Brother     Breast cancer Neg Hx     Ovarian cancer Neg Hx     Colon cancer Neg Hx        Blood pressure 118/74, pulse 71, height 5' 3" (1 6 m), weight 78 4 kg (172 lb 12 8 oz), last menstrual period 2020, SpO2 99 %, not currently breastfeeding  and Body mass index is 30 61 kg/m²  Physical Exam  Constitutional:       Appearance: Normal appearance  She is well-developed  She is obese  HENT:      Head: Normocephalic and atraumatic  Eyes:      Extraocular Movements: Extraocular movements intact  Conjunctiva/sclera: Conjunctivae normal    Neck:      Musculoskeletal: Normal range of motion and neck supple  Thyroid: No thyromegaly  Trachea: No tracheal deviation  Cardiovascular:      Rate and Rhythm: Normal rate and regular rhythm  Heart sounds: Normal heart sounds  Pulmonary:      Effort: Pulmonary effort is normal  No respiratory distress        Breath sounds: Normal breath sounds  No stridor  No wheezing or rales  Abdominal:      General: Bowel sounds are normal  There is no distension  Palpations: Abdomen is soft  There is no mass  Tenderness: There is no abdominal tenderness  There is no guarding or rebound  Hernia: No hernia is present  Musculoskeletal: Normal range of motion  General: No tenderness  Lymphadenopathy:      Cervical: No cervical adenopathy  Skin:     General: Skin is warm  Findings: No erythema or rash  Neurological:      Mental Status: She is alert and oriented to person, place, and time  Psychiatric:         Mood and Affect: Mood normal          Behavior: Behavior normal          Thought Content: Thought content normal          Judgment: Judgment normal          Breasts: breasts appear normal, no suspicious masses, no skin or nipple changes or axillary nodes, symmetric fibrous changes in both upper outer quadrants  vulva: normal external genitalia for age and no lesions, masses, epithelial changes, or exudate  vagina: color pink and rugae  well formed rugae  cervix: nullip, no lesions ,  IUD string  3 cm and pap obtained  uterus: NSSC, AF, NT, mobile  adnexa: no masses or tenderness      A/P:  Pt is a 23 y o  Walda Dial with      Diagnoses and all orders for this visit:    Encounter for annual routine gynecological examination    Pap smear for cervical cancer screening  -     Thinprep Tis Pap Reflex HPV mRNA E6/E7, Chlamydia/N gonorrhoeae    Screening examination for STD (sexually transmitted disease)  -     Thinprep Tis Pap Reflex HPV mRNA E6/E7, Chlamydia/N gonorrhoeae    Contraceptive, surveillance, intrauterine device  -IUD strings visualized and appropriate length    Dietary calcium deficiency  Patient advised recommendation of daily dietary calcium of 1000 mg calcium  Inadequate exercise  Patient advised recommendation of exercise 5 times per week for 30 minutes      BMI 30 0-30 9,adult  Patient advised recommendation of BMI to be between 19-25

## 2021-01-11 LAB
C TRACH RRNA SPEC QL NAA+PROBE: NOT DETECTED
CLINICAL INFO: NORMAL
CYTO CVX: NORMAL
CYTOLOGY CMNT CVX/VAG CYTO-IMP: NORMAL
DATE PREVIOUS BX: NORMAL
LMP START DATE: NORMAL
N GONORRHOEA RRNA SPEC QL NAA+PROBE: NOT DETECTED
SL AMB PREV. PAP:: NORMAL
SPECIMEN SOURCE CVX/VAG CYTO: NORMAL

## 2021-01-27 DIAGNOSIS — Z23 ENCOUNTER FOR IMMUNIZATION: ICD-10-CM

## 2021-02-03 ENCOUNTER — IMMUNIZATIONS (OUTPATIENT)
Dept: FAMILY MEDICINE CLINIC | Facility: HOSPITAL | Age: 26
End: 2021-02-03

## 2021-02-03 DIAGNOSIS — Z23 ENCOUNTER FOR IMMUNIZATION: Primary | ICD-10-CM

## 2021-02-03 PROCEDURE — 91301 SARS-COV-2 / COVID-19 MRNA VACCINE (MODERNA) 100 MCG: CPT

## 2021-02-03 PROCEDURE — 0011A SARS-COV-2 / COVID-19 MRNA VACCINE (MODERNA) 100 MCG: CPT

## 2021-03-05 ENCOUNTER — IMMUNIZATIONS (OUTPATIENT)
Dept: FAMILY MEDICINE CLINIC | Facility: HOSPITAL | Age: 26
End: 2021-03-05

## 2021-03-05 DIAGNOSIS — Z23 ENCOUNTER FOR IMMUNIZATION: Primary | ICD-10-CM

## 2021-03-05 PROCEDURE — 0012A SARS-COV-2 / COVID-19 MRNA VACCINE (MODERNA) 100 MCG: CPT

## 2021-03-05 PROCEDURE — 91301 SARS-COV-2 / COVID-19 MRNA VACCINE (MODERNA) 100 MCG: CPT

## 2021-06-04 NOTE — RESULT NOTES
Verified Results  (1) THROAT CULTURE (CULTURE, UPPER RESPIRATORY) [id number] 12:00AM [place] [facility]     Test Name Result Flag Reference   Upper Respiratory Culture Final report A    Result 1 Comment A    Beta hemolytic Streptococcus, group B  Scant growth  Penicillin and ampicillin are drugs of choice for treatment of  beta-hemolytic streptococcal infections  Susceptibility testing of  penicillins and other beta-lactam agents approved by the FDA for  treatment of beta-hemolytic streptococcal infections need not be  performed routinely because nonsusceptible isolates are extremely  rare in any beta-hemolytic streptococcus and have not been reported  for Streptococcus pyogenes (group A)   (CLSI 2011)       Discussion/Summary   please notify pt that her throat culture did come back positive for strep - abx sent to pharmacy
.

## 2022-02-01 ENCOUNTER — ANNUAL EXAM (OUTPATIENT)
Dept: OBGYN CLINIC | Facility: CLINIC | Age: 27
End: 2022-02-01
Payer: COMMERCIAL

## 2022-02-01 VITALS
DIASTOLIC BLOOD PRESSURE: 78 MMHG | HEIGHT: 62 IN | SYSTOLIC BLOOD PRESSURE: 120 MMHG | BODY MASS INDEX: 33.97 KG/M2 | WEIGHT: 184.6 LBS

## 2022-02-01 DIAGNOSIS — E58 DIETARY CALCIUM DEFICIENCY: ICD-10-CM

## 2022-02-01 DIAGNOSIS — Z20.2 POSSIBLE EXPOSURE TO STD: ICD-10-CM

## 2022-02-01 DIAGNOSIS — Z01.419 ENCOUNTER FOR ANNUAL ROUTINE GYNECOLOGICAL EXAMINATION: Primary | ICD-10-CM

## 2022-02-01 DIAGNOSIS — Z72.3 INADEQUATE EXERCISE: ICD-10-CM

## 2022-02-01 DIAGNOSIS — Z30.431 CONTRACEPTIVE, SURVEILLANCE, INTRAUTERINE DEVICE: ICD-10-CM

## 2022-02-01 PROCEDURE — S0612 ANNUAL GYNECOLOGICAL EXAMINA: HCPCS | Performed by: OBSTETRICS & GYNECOLOGY

## 2022-02-01 PROCEDURE — 87491 CHLMYD TRACH DNA AMP PROBE: CPT | Performed by: OBSTETRICS & GYNECOLOGY

## 2022-02-01 PROCEDURE — 87591 N.GONORRHOEAE DNA AMP PROB: CPT | Performed by: OBSTETRICS & GYNECOLOGY

## 2022-02-01 RX ORDER — RIMEGEPANT SULFATE 75 MG/75MG
TABLET, ORALLY DISINTEGRATING ORAL
COMMUNITY
Start: 2022-01-27

## 2022-02-01 RX ORDER — TOPIRAMATE 50 MG/1
50 TABLET, FILM COATED ORAL DAILY
COMMUNITY
Start: 2022-01-25

## 2022-02-01 NOTE — PROGRESS NOTES
Pt is a 27 y o  Santy Jesse with Patient's last menstrual period was 2022 (approximate)  using [employer] for [facility] presents for preventive care  She notes the same partner since her last STI evaluation  In her lifetime she has been involved with 1 partner   Safe sexual practices (monogomy, condoms) are followed consistently  · She does  feel safe in the relationship  She does feel safe in her home  · Her calcium intake encompasses pnv, milk (cow, goat, almond, cashew, soy, etc) and cheese for a total of 3-4 servings daily on average  She does take additional Vitamin D (MVI or supplement)  · She exercises 2-3 times per week  · Her menses occur every 1-2 months, last 1 days and require panty liner every 7-8 hours  Menstrual History:  OB History        0    Para   0    Term   0       0    AB   0    Living   0       SAB   0    IAB   0    Ectopic   0    Multiple   0    Live Births   0           Obstetric Comments   Age at menarche 8      Menses: 1-2 months/1/panty liner every 8 hours            Menarche age: 8  Patient's last menstrual period was 2022 (approximate)  ·      · She has completed the HPV vaccine series appropriate for age    · tobacco use : does not use tobacco              · Colonoscopy/mammogram: not indicated  · Pap: 2021-wnl  · Desires ch/gerard screening    Past Medical History:   Diagnosis Date    Grief reaction 2019    Metrorrhagia     Migraine     Nexplanon in place     Strep tonsillitis 2018    Vaccine for human papilloma virus (HPV) types 6, 11, 16, and 18 administered     completed series       Past Surgical History:   Procedure Laterality Date    COLONOSCOPY      due to N/V/D as a teen    UPPER GASTROINTESTINAL ENDOSCOPY      WISDOM TOOTH EXTRACTION         OB History    Para Term  AB Living   0 0 0 0 0 0   SAB IAB Ectopic Multiple Live Births   0 0 0 0 0   Obstetric Comments   Age at menarche 8 Menses: 1-2 months/1/panty liner every 8 hours              Current Outpatient Medications:     levonorgestrel (KYLEENA) 19 5 MG intrauterine device, 1 Intra Uterine Device by Intrauterine route once Inserted 1/2/19, Disp: , Rfl:     Magnesium 200 MG CHEW, , Disp: , Rfl:     Nurtec 75 MG TBDP, TAKE 1 TABLET BY MOUTH AT ONSET OF HEADACHE  DO NOT TAKE MORE THAN ONCE IN 24 HOURS, Disp: , Rfl:     ondansetron (ZOFRAN-ODT) 4 mg disintegrating tablet, DISSOLVE ONE TABLET BY MOUTH EVERY 6 TO 8 HOURS AS NEEDED FOR NAUSEA, Disp: , Rfl: 0    Prenatal Vit-Fe Fumarate-FA (PRENATAL 1+1 PO), Take by mouth, Disp: , Rfl:     topiramate (TOPAMAX) 50 MG tablet, Take 50 mg by mouth daily, Disp: , Rfl:     Allergies   Allergen Reactions    Penicillins Hives       Social History     Socioeconomic History    Marital status: Single     Spouse name: None    Number of children: 0    Years of education: college student    Highest education level: High school graduate   Occupational History    Occupation:    Tobacco Use    Smoking status: Never Smoker    Smokeless tobacco: Never Used   Vaping Use    Vaping Use: Never used   Substance and Sexual Activity    Alcohol use: Yes     Alcohol/week: 0 0 - 1 0 standard drinks     Comment: special occasions only    Drug use: No    Sexual activity: Yes     Partners: Male     Birth control/protection: I U D       Comment: lifetime partners: 1   Other Topics Concern    None   Social History Narrative    Always uses seat belt    No preference on Mormonism beliefs    Accepts blood products            Exercise: 3x/week until gym shut down    Calcium: 1 c milk 4-5x/week, 2-3 cheese daily, PNV daily     Social Determinants of Health     Financial Resource Strain: Not on file   Food Insecurity: Not on file   Transportation Needs: Not on file   Physical Activity: Not on file   Stress: Not on file   Social Connections: Not on file   Intimate Partner Violence: Not on file   Housing Stability: Not on file       Family History   Problem Relation Age of Onset    Hypertension Mother     Hypertension Father     Heart disease Father         cardiac disorder,  7 stents ;  52's    Thyroid cancer Father     Diabetes Maternal Grandmother     Throat cancer Maternal Grandmother     Brain cancer Family     No Known Problems Sister         estranged    No Known Problems Brother     Kidney cancer Maternal Grandfather     No Known Problems Paternal Grandmother     No Known Problems Paternal Laxmi Reyna Suicidality Brother         2019    Drug abuse Brother     Breast cancer Neg Hx     Ovarian cancer Neg Hx     Colon cancer Neg Hx        Blood pressure 120/78, height 5' 2" (1 575 m), weight 83 7 kg (184 lb 9 6 oz), last menstrual period 2022, not currently breastfeeding  and Body mass index is 33 76 kg/m²  Physical Exam  Constitutional:       General: She is not in acute distress  Appearance: Normal appearance  She is well-developed  She is obese  She is not ill-appearing  HENT:      Head: Normocephalic and atraumatic  Eyes:      Extraocular Movements: Extraocular movements intact  Conjunctiva/sclera: Conjunctivae normal    Neck:      Thyroid: No thyromegaly  Trachea: No tracheal deviation  Cardiovascular:      Rate and Rhythm: Normal rate and regular rhythm  Heart sounds: Normal heart sounds  Pulmonary:      Effort: Pulmonary effort is normal  No respiratory distress  Breath sounds: Normal breath sounds  No stridor  No wheezing or rales  Abdominal:      General: Bowel sounds are normal  There is no distension  Palpations: Abdomen is soft  There is no mass  Tenderness: There is no abdominal tenderness  There is no guarding or rebound  Hernia: No hernia is present  Musculoskeletal:         General: No tenderness  Normal range of motion  Cervical back: Normal range of motion and neck supple     Lymphadenopathy: Cervical: No cervical adenopathy  Skin:     General: Skin is warm  Findings: No erythema or rash  Neurological:      Mental Status: She is alert and oriented to person, place, and time  Psychiatric:         Mood and Affect: Mood normal          Behavior: Behavior normal          Thought Content: Thought content normal          Judgment: Judgment normal          Breasts: breasts appear normal, no suspicious masses, no skin or nipple changes or axillary nodes  vulva: normal external genitalia for age and no lesions, masses, epithelial changes, or exudate  vagina: color pink and rugae  well formed rugae  cervix: nullip, no lesions  and  IUD string  3 cm  uterus: NSSC, AF, NT, mobile  adnexa: no masses or tenderness      A/P:  Pt is a 27 y o  Nico Camacho with      Leopoldo Roosevelt was seen today for gynecologic exam     Diagnoses and all orders for this visit:    Encounter for annual routine gynecological examination  -pap up to date    Possible exposure to STD  -     [facility] amplified DNA by PCR    Contraceptive, surveillance, intrauterine device  IUD strings appropriately located    Dietary calcium deficiency  Patient advised recommendation of daily dietary calcium of 1000 mg calcium  Inadequate exercise  Patient advised recommendation of exercise 5 times per week for 30 minutes  BMI 33 0-33 9,adult  Patient advised recommendation of BMI to be between 19-25

## 2022-02-03 LAB
C TRACH DNA SPEC QL NAA+PROBE: NEGATIVE
N GONORRHOEA DNA SPEC QL NAA+PROBE: NEGATIVE

## 2022-07-01 ENCOUNTER — TELEPHONE (OUTPATIENT)
Dept: FAMILY MEDICINE CLINIC | Facility: HOSPITAL | Age: 27
End: 2022-07-01

## 2023-02-28 ENCOUNTER — OFFICE VISIT (OUTPATIENT)
Dept: FAMILY MEDICINE CLINIC | Facility: HOSPITAL | Age: 28
End: 2023-02-28

## 2023-02-28 VITALS
HEART RATE: 64 BPM | WEIGHT: 194.4 LBS | DIASTOLIC BLOOD PRESSURE: 76 MMHG | TEMPERATURE: 98.1 F | BODY MASS INDEX: 35.56 KG/M2 | SYSTOLIC BLOOD PRESSURE: 118 MMHG

## 2023-02-28 DIAGNOSIS — J02.9 PHARYNGITIS, UNSPECIFIED ETIOLOGY: Primary | ICD-10-CM

## 2023-02-28 LAB — S PYO AG THROAT QL: NEGATIVE

## 2023-02-28 RX ORDER — ERENUMAB-AOOE 140 MG/ML
INJECTION, SOLUTION SUBCUTANEOUS
COMMUNITY
Start: 2023-02-24

## 2023-03-01 NOTE — PROGRESS NOTES
Assessment/Plan:     Rapid strep negative  Send throat culture to confirm  Discussed with pt that symptoms are likely viral    Treat with OTC pain relievers, tea with honey, salt water gargles  Diagnoses and all orders for this visit:    Pharyngitis, unspecified etiology  -     POCT rapid strepA  -     Throat culture    Other orders  -     Aimovig 140 MG/ML SOAJ; INJECT 1 ML ONCE A MONTH          Subjective:     Patient ID: Gissell Swanson is a 24 y o  female  2 days ago started with cough and mucus in throat  Today cough is worse  Throat burns  No nasal congestion or runny nose  No sob  No fever or chills  No N/V/D  No body aches or headache  Took COVID test today and was negative  Co-worker with strep  Review of Systems   Constitutional: Negative for chills and fever  HENT: Positive for postnasal drip and sore throat  Negative for congestion  Respiratory: Positive for cough  Negative for shortness of breath and wheezing  Gastrointestinal: Negative for abdominal pain, diarrhea, nausea and vomiting  Musculoskeletal: Negative for myalgias  Neurological: Negative for headaches  The following portions of the patient's history were reviewed and updated as appropriate: allergies, current medications, past family history, past medical history, past social history, past surgical history and problem list     Objective:  Vitals:    02/28/23 1902   BP: 118/76   Pulse: 64   Temp: 98 1 °F (36 7 °C)      Physical Exam  Vitals reviewed  Constitutional:       General: She is not in acute distress  Appearance: She is well-developed  She is not ill-appearing  HENT:      Right Ear: Tympanic membrane, ear canal and external ear normal       Left Ear: Tympanic membrane, ear canal and external ear normal       Mouth/Throat:      Mouth: Mucous membranes are moist       Pharynx: Oropharynx is clear  Uvula midline  No oropharyngeal exudate or posterior oropharyngeal erythema        Tonsils: No tonsillar exudate  0 on the right  0 on the left  Cardiovascular:      Rate and Rhythm: Normal rate and regular rhythm  Heart sounds: Normal heart sounds  No murmur heard  Pulmonary:      Effort: Pulmonary effort is normal       Breath sounds: Normal breath sounds  Lymphadenopathy:      Cervical: No cervical adenopathy  Skin:     General: Skin is warm and dry  Neurological:      Mental Status: She is alert and oriented to person, place, and time

## 2023-07-27 ENCOUNTER — TELEPHONE (OUTPATIENT)
Dept: BARIATRICS | Facility: CLINIC | Age: 28
End: 2023-07-27

## 2023-07-27 NOTE — TELEPHONE ENCOUNTER
LVM for patient to call office for info on surgical process.  Left office info to call back [phone]

## 2023-08-02 ENCOUNTER — TELEPHONE (OUTPATIENT)
Dept: BARIATRICS | Facility: CLINIC | Age: 28
End: 2023-08-02

## 2023-08-02 NOTE — TELEPHONE ENCOUNTER
Left message for patient for any question and concern for the surgical process in our office. Left office information [phone].

## 2023-08-31 ENCOUNTER — TELEPHONE (OUTPATIENT)
Dept: BARIATRICS | Facility: CLINIC | Age: 28
End: 2023-08-31